# Patient Record
Sex: FEMALE | Race: WHITE
[De-identification: names, ages, dates, MRNs, and addresses within clinical notes are randomized per-mention and may not be internally consistent; named-entity substitution may affect disease eponyms.]

---

## 2019-10-16 ENCOUNTER — HOSPITAL ENCOUNTER (EMERGENCY)
Dept: HOSPITAL 7 - FB.ED | Age: 59
LOS: 1 days | Discharge: NURSING, REHAB OR LONG-TERM CARE | End: 2019-10-17
Payer: MEDICAID

## 2019-10-16 DIAGNOSIS — E03.9: ICD-10-CM

## 2019-10-16 DIAGNOSIS — E87.6: ICD-10-CM

## 2019-10-16 DIAGNOSIS — K70.9: ICD-10-CM

## 2019-10-16 DIAGNOSIS — F32.9: ICD-10-CM

## 2019-10-16 DIAGNOSIS — K92.2: Primary | ICD-10-CM

## 2019-10-16 DIAGNOSIS — Z88.2: ICD-10-CM

## 2019-10-16 DIAGNOSIS — F17.210: ICD-10-CM

## 2019-10-16 DIAGNOSIS — I10: ICD-10-CM

## 2019-10-16 PROCEDURE — 96376 TX/PRO/DX INJ SAME DRUG ADON: CPT

## 2019-10-16 PROCEDURE — 96375 TX/PRO/DX INJ NEW DRUG ADDON: CPT

## 2019-10-16 PROCEDURE — 81001 URINALYSIS AUTO W/SCOPE: CPT

## 2019-10-16 PROCEDURE — 86922 COMPATIBILITY TEST ANTIGLOB: CPT

## 2019-10-16 PROCEDURE — 96365 THER/PROPH/DIAG IV INF INIT: CPT

## 2019-10-16 PROCEDURE — 99285 EMERGENCY DEPT VISIT HI MDM: CPT

## 2019-10-16 PROCEDURE — 51702 INSERT TEMP BLADDER CATH: CPT

## 2019-10-16 PROCEDURE — 85025 COMPLETE CBC W/AUTO DIFF WBC: CPT

## 2019-10-16 PROCEDURE — 80053 COMPREHEN METABOLIC PANEL: CPT

## 2019-10-16 PROCEDURE — 36415 COLL VENOUS BLD VENIPUNCTURE: CPT

## 2019-10-16 PROCEDURE — G0480 DRUG TEST DEF 1-7 CLASSES: HCPCS

## 2019-10-16 PROCEDURE — 80320 DRUG SCREEN QUANTALCOHOLS: CPT

## 2019-10-16 PROCEDURE — 36430 TRANSFUSION BLD/BLD COMPNT: CPT

## 2019-10-16 PROCEDURE — P9016 RBC LEUKOCYTES REDUCED: HCPCS

## 2019-10-16 PROCEDURE — C9113 INJ PANTOPRAZOLE SODIUM, VIA: HCPCS

## 2019-10-16 PROCEDURE — 96366 THER/PROPH/DIAG IV INF ADDON: CPT

## 2019-10-16 PROCEDURE — 82150 ASSAY OF AMYLASE: CPT

## 2019-10-16 PROCEDURE — 85610 PROTHROMBIN TIME: CPT

## 2019-10-16 PROCEDURE — 86901 BLOOD TYPING SEROLOGIC RH(D): CPT

## 2019-10-16 PROCEDURE — 86920 COMPATIBILITY TEST SPIN: CPT

## 2019-10-16 PROCEDURE — 86850 RBC ANTIBODY SCREEN: CPT

## 2019-10-16 PROCEDURE — 86900 BLOOD TYPING SEROLOGIC ABO: CPT

## 2019-10-16 PROCEDURE — 83735 ASSAY OF MAGNESIUM: CPT

## 2019-10-16 PROCEDURE — P9017 PLASMA 1 DONOR FRZ W/IN 8 HR: HCPCS

## 2019-10-16 NOTE — EDM.PDOC
ED HPI GENERAL MEDICAL PROBLEM





- General


Chief Complaint: Abdominal Pain


Stated Complaint: GI BLEED


Time Seen by Provider: 10/16/19 23:26


Source of Information: Reports: Patient, EMS, Family


History Limitations: Reports: No Limitations





- History of Present Illness


INITIAL COMMENTS - FREE TEXT/NARRATIVE: 





Patient presents by EMS for coffee ground emesis x 2 days associated with 

generalized abdominal pain and decreased appetite. She has a h/o alcohol abuse 

and associated liver disease. The patient continues to consume @1L hard alcohol 

every 3 days. Daughter noticed her urine was dark tonight perhaps due to blood, 

she has also noticed that her mother's has been somewhat more confused over the 

last 2 days. Daughter and patient deny prior h/o GI bleed.


Duration: Day(s): (2)


Location: Reports: Abdomen


Associated Symptoms: Reports: Nausea/Vomiting





- Related Data


 Allergies











Allergy/AdvReac Type Severity Reaction Status Date / Time


 


Sulfa (Sulfonamide Allergy  Vomiting Verified 10/17/19 00:29





Antibiotics)     











Home Meds: 


 Home Meds





Levothyroxine Sodium [Synthroid] 62.5 mcg PO MO 04/12/16 [History]


Levothyroxine Sodium [Synthroid] 125 mcg PO SUMOTUWETHSA 04/12/16 [History]


Venlafaxine [Effexor XR] 150 mg PO DAILY 04/12/16 [History]


Montelukast Sodium [Singulair] 10 mg PO DAILY 10/17/19 [History]


Potassium Chloride 10 meq PO DAILY 10/17/19 [History]


Pravastatin Sodium 20 mg PO BEDTIME 10/17/19 [History]











Past Medical History


HEENT History: Reports: Other (See Below)


Other HEENT History: Wears glasses.


Cardiovascular History: Reports: Hypertension.  Denies: CAD


Gastrointestinal History: Reports: Other (See Below) (Alcoholic liver disease)


OB/GYN History: Reports: Pregnancy


Musculoskeletal History: Reports: Other (See Below)


Other Musculoskeletal History: Fingers affected, etiology unknown, left hand.


Psychiatric History: Reports: Depression, Other (See Below)


Other Psychiatric History: ALCOHOL ABUSE


Endocrine/Metabolic History: Reports: Hypothyroidism


Dermatologic History: Reports: Other (See Below)


Other Dermatologic History: Had a cyst in her neck area, surgically removed.  

Experiences dry skin.





- Infectious Disease History


Infectious Disease History: Reports: Chicken Pox, Mumps, Other (See Below)


Other Infectious Disease History: States she had measles in the past, uncertain 

if Telugu or Red.





- Past Surgical History


Female  Surgical History: Reports: Hysterectomy, Other (See Below)





Social & Family History





- Family History


Family Medical History: Unobtainable





- Tobacco Use


Smoking Status *Q: Current Every Day Smoker


Tobacco Use Within Last Twelve Months: Cigarettes





- Alcohol Use


Alcohol Use History: Yes


Alcohol Use in Last Twelve Months: Yes


Alcohol Use Frequency: Daily





- Recreational Drug Use


Recreational Drug Use: No





ED ROS GENERAL





- Review of Systems


Review Of Systems: ROS reveals no pertinent complaints other than HPI.





ED EXAM, GI/ABD





- Physical Exam


Exam: See Below


Exam Limited By: No Limitations


General Appearance: Alert, No Apparent Distress


Eyes: Bilateral: EOMI (bilateral icteric sclera)


Ears: Normal External Exam


Nose: Normal Inspection


Throat/Mouth: No Airway Compromise


Head: Atraumatic, Normocephalic


Neck: Normal Inspection, Full Range of Motion


Respiratory/Chest: No Respiratory Distress, Lungs Clear, Normal Breath Sounds


Cardiovascular: Regular Rate, Rhythm, No Murmur


GI/Abdominal Exam: Normal Bowel Sounds, Soft, Distended (mild), Tender (mild 

generalized).  No: Guarding, Rigid, Rebound


Extremities: Pedal Edema


Neurological: Alert, Oriented, No Motor/Sensory Deficits


Psychiatric: Flat Affect


Skin Exam: Intact, Jaundice





Course





- Vital Signs


Text/Narrative:: 





Triage Vitals: T96.7F, /61, , Sa02 100%RA





0115: /67, , Sa02 100%RA


Last Recorded V/S: 


 Last Vital Signs











Temp  35.8 C   10/16/19 23:20


 


Pulse  127 H  10/16/19 23:20


 


Resp  18   10/16/19 23:20


 


BP  127/60   10/16/19 23:20


 


Pulse Ox  100   10/16/19 23:20














- Orders/Labs/Meds


Orders: 


 Active Orders 24 hr











 Category Date Time Status


 


 De La Garza Catheter Insertion [Insert Urinary Catheter] [OM. Care  10/16/19 23:30 

Ordered





 PC] Q24H   


 


 Urinary Catheter Assessment [RC] QSHIFT Care  10/16/19 23:27 Active


 


 AMMONIA, PLASMA Stat Lab  10/16/19 23:40 Stop Req


 


 FRESH FROZEN PLASMA [BBK] Stat Lab  10/17/19 00:05 Received


 


 PATIENT RETYPE [BBK] Stat Lab  10/16/19 23:40 Results


 


 RED BLOOD CELLS LP [BBK] Stat Lab  10/17/19 00:07 Received


 


 TYPE AND SCREEN [BBK] Stat Lab  10/16/19 23:40 Received


 


 Pantoprazole [ProTONIX IV***] 80 mg Med  10/16/19 23:30 Active





 Sodium Chloride 0.9% [Normal Saline] 100 ml   





 IV .Continuous   


 


 Potassium Chloride [KCL 20 MEQ in Water 100 ML] 20 meq Med  10/17/19 00:47 

Active





 Premix Bag 1 bag   





 IV ONETIME   


 


 Sodium Chloride 0.9% [Normal Saline] 1,000 ml Med  10/16/19 23:30 Active





 IV ASDIRECTED   


 


 Sodium Chloride 0.9% [Normal Saline] 250 ml Med  10/17/19 00:15 Active





 IV ASDIRECTED   


 


 Sodium Chloride 0.9% [Normal Saline] 250 ml Med  10/17/19 00:15 Active





 IV ASDIRECTED   


 


 Sodium Chloride 0.9% [Saline Flush] Med  10/16/19 23:20 Active





 10 ml FLUSH ASDIRECTED PRN   


 


 Sodium Chloride 0.9% [Saline Flush] Med  10/16/19 23:20 Active





 10 ml FLUSH ASDIRECTED PRN   


 


 Saline Lock Insert [OM.PC] Routine Oth  10/16/19 23:20 Ordered


 


 Saline Lock Insert [OM.PC] Routine Oth  10/16/19 23:20 Ordered


 


 Transfuse Fresh Frozen Plasma [COMM] Stat Oth  10/17/19 00:08 Ordered


 


 Transfuse PRBC [Transfuse Red Blood Cells] [COMM] Stat Oth  10/17/19 00:08 

Ordered








 Medication Orders





Pantoprazole Sodium 80 mg/ (Sodium Chloride)  100 mls @ 10 mls/hr IV 

.Continuous ALLIE


   Last Admin: 10/17/19 00:05  Dose: 10 mls/hr


Sodium Chloride (Normal Saline)  1,000 mls @ 200 mls/hr IV ASDIRECTED ALLIE


   Last Admin: 10/16/19 23:45  Dose: 200 mls/hr


Sodium Chloride (Normal Saline)  250 mls @ 100 mls/hr IV ASDIRECTED ALLIE


Sodium Chloride (Normal Saline)  250 mls @ 100 mls/hr IV ASDIRECTED ALLIE


Potassium Chloride 20 meq/ (Premix)  100 mls @ 50 mls/hr IV ONETIME ONE


   Stop: 10/17/19 02:46


   Last Admin: 10/17/19 00:55  Dose: 50 mls/hr


Sodium Chloride (Saline Flush)  10 ml FLUSH ASDIRECTED PRN


   PRN Reason: Keep Vein Open


   Last Admin: 10/16/19 23:40  Dose: 10 ml


Sodium Chloride (Saline Flush)  10 ml FLUSH ASDIRECTED PRN


   PRN Reason: Keep Vein Open


   Last Admin: 10/17/19 00:15  Dose: 10 ml








Labs: 


 Laboratory Tests











  10/16/19 10/16/19 10/16/19 Range/Units





  23:19 23:40 23:40 


 


WBC   13.5 H   (4.5-12.0)  X10-3/uL


 


RBC   2.26 L   (3.23-5.20)  x10(6)uL


 


Hgb   7.4 L   (11.5-15.5)  g/dL


 


Hct   22.5 L D   (30.0-51.3)  %


 


MCV   99.6 H   (80-96)  fL


 


MCH   32.6   (27.7-33.6)  pg


 


MCHC   32.8   (32.2-35.4)  g/dL


 


RDW   28.6 H   (11.5-15.5)  %


 


Plt Count   129   (125-369)  X10(3)uL


 


MPV   9.1   (7.4-10.4)  fL


 


Add Manual Diff   Yes   


 


Neutrophils % (Manual)   50   (46-82)  %


 


Band Neutrophils %   4   (0-6)  %


 


Lymphocytes % (Manual)   38 H   (13-37)  %


 


Monocytes % (Manual)   8   (4-12)  %


 


Anisocytosis   Moderate H   


 


PT    18.5 H  (8.7-11.1)  


 


INR    1.92 H  (0.89-1.13)  


 


Sodium     (135-145)  mmol/L


 


Potassium     (3.5-5.3)  mmol/L


 


Chloride     (100-110)  mmol/L


 


Carbon Dioxide     (21-32)  mmol/L


 


BUN     (7-18)  mg/dL


 


Creatinine     (0.55-1.02)  mg/dL


 


Est Cr Clr Drug Dosing     mL/min


 


Estimated GFR (MDRD)     (>60)  


 


BUN/Creatinine Ratio     (9-20)  


 


Glucose     ()  mg/dL


 


Calcium     (8.6-10.2)  mg/dL


 


Magnesium     (1.8-2.5)  mg/dL


 


Total Bilirubin     (0.1-1.3)  mg/dL


 


AST     (5-25)  IU/L


 


ALT     (12-36)  U/L


 


Alkaline Phosphatase     ()  IU/L


 


Total Protein     (6.0-8.0)  g/dL


 


Albumin     (3.5-5.2)  g/dL


 


Globulin     g/dL


 


Albumin/Globulin Ratio     


 


Amylase     ()  U/L


 


Urine Color  Yellow    (YELLOW)  


 


Urine Appearance  Slightly cloudy    (CLEAR)  


 


Urine pH  5.0    (5.0-6.5)  


 


Ur Specific Gravity  1.020    (1.010-1.025)  


 


Urine Protein  Negative    (NEGATIVE)  mg/dL


 


Urine Glucose (UA)  Normal    (NORMAL)  mg/dL


 


Urine Ketones  15 H    (NEGATIVE)  mg/dL


 


Urine Occult Blood  Moderate H    (NEGATIVE)  


 


Urine Nitrite  Negative    (NEGATIVE)  


 


Urine Bilirubin  Small H    (NEGATIVE)  


 


Urine Urobilinogen  1 H    (NEGATIVE)  mg/dL


 


Ur Leukocyte Esterase  Negative    (NEGATIVE)  


 


Urine RBC  5-10 H    (0-5)  


 


Urine WBC  0-5    (0-5)  


 


Ur Squamous Epith Cells  Few H    (NS,R,O)  


 


Urine Bacteria  Few H    (NS)  


 


Ethyl Alcohol     (<0.03)  %


 


Blood Type     


 


Gel Antibody Screen     


 


Crossmatch     














  10/16/19 10/16/19 10/16/19 Range/Units





  23:40 23:40 23:40 


 


WBC     (4.5-12.0)  X10-3/uL


 


RBC     (3.23-5.20)  x10(6)uL


 


Hgb     (11.5-15.5)  g/dL


 


Hct     (30.0-51.3)  %


 


MCV     (80-96)  fL


 


MCH     (27.7-33.6)  pg


 


MCHC     (32.2-35.4)  g/dL


 


RDW     (11.5-15.5)  %


 


Plt Count     (125-369)  X10(3)uL


 


MPV     (7.4-10.4)  fL


 


Add Manual Diff     


 


Neutrophils % (Manual)     (46-82)  %


 


Band Neutrophils %     (0-6)  %


 


Lymphocytes % (Manual)     (13-37)  %


 


Monocytes % (Manual)     (4-12)  %


 


Anisocytosis     


 


PT     (8.7-11.1)  


 


INR     (0.89-1.13)  


 


Sodium  131 L    (135-145)  mmol/L


 


Potassium  1.7 L*    (3.5-5.3)  mmol/L


 


Chloride  80 L*    (100-110)  mmol/L


 


Carbon Dioxide  18 L    (21-32)  mmol/L


 


BUN  7    (7-18)  mg/dL


 


Creatinine  1.4 H    (0.55-1.02)  mg/dL


 


Est Cr Clr Drug Dosing  31.75    mL/min


 


Estimated GFR (MDRD)  39 L    (>60)  


 


BUN/Creatinine Ratio  5.0 L    (9-20)  


 


Glucose  76 L    ()  mg/dL


 


Calcium  9.8    (8.6-10.2)  mg/dL


 


Magnesium  1.6 L    (1.8-2.5)  mg/dL


 


Total Bilirubin  9.6 H    (0.1-1.3)  mg/dL


 


AST  736 H*    (5-25)  IU/L


 


ALT  190 H*    (12-36)  U/L


 


Alkaline Phosphatase  129 H    ()  IU/L


 


Total Protein  6.7    (6.0-8.0)  g/dL


 


Albumin  3.2 L    (3.5-5.2)  g/dL


 


Globulin  3.5    g/dL


 


Albumin/Globulin Ratio  0.9    


 


Amylase  63    ()  U/L


 


Urine Color     (YELLOW)  


 


Urine Appearance     (CLEAR)  


 


Urine pH     (5.0-6.5)  


 


Ur Specific Gravity     (1.010-1.025)  


 


Urine Protein     (NEGATIVE)  mg/dL


 


Urine Glucose (UA)     (NORMAL)  mg/dL


 


Urine Ketones     (NEGATIVE)  mg/dL


 


Urine Occult Blood     (NEGATIVE)  


 


Urine Nitrite     (NEGATIVE)  


 


Urine Bilirubin     (NEGATIVE)  


 


Urine Urobilinogen     (NEGATIVE)  mg/dL


 


Ur Leukocyte Esterase     (NEGATIVE)  


 


Urine RBC     (0-5)  


 


Urine WBC     (0-5)  


 


Ur Squamous Epith Cells     (NS,R,O)  


 


Urine Bacteria     (NS)  


 


Ethyl Alcohol   < 0.03   (<0.03)  %


 


Blood Type    A POSITIVE  


 


Gel Antibody Screen    Negative  


 


Crossmatch    See Detail  











Meds: 


Medications











Generic Name Dose Route Start Last Admin





  Trade Name Freq  PRN Reason Stop Dose Admin


 


Pantoprazole Sodium 80 mg/  100 mls @ 10 mls/hr  10/16/19 23:30  10/17/19 00:05





  Sodium Chloride  IV   10 mls/hr





  .Continuous ALLIE   Administration





     





     





     





     


 


Sodium Chloride  1,000 mls @ 200 mls/hr  10/16/19 23:30  10/16/19 23:45





  Normal Saline  IV   200 mls/hr





  ASDIRECTED ALLIE   Administration





     





     





     





     


 


Sodium Chloride  250 mls @ 100 mls/hr  10/17/19 00:15  





  Normal Saline  IV   





  ASDIRECTED ALLIE   





     





     





     





     


 


Sodium Chloride  250 mls @ 100 mls/hr  10/17/19 00:15  





  Normal Saline  IV   





  ASDIRECTED ALLIE   





     





     





     





     


 


Potassium Chloride 20 meq/  100 mls @ 50 mls/hr  10/17/19 00:47  10/17/19 00:55





  Premix  IV  10/17/19 02:46  50 mls/hr





  ONETIME ONE   Administration





     





     





     





     


 


Sodium Chloride  10 ml  10/16/19 23:20  10/16/19 23:40





  Saline Flush  FLUSH   10 ml





  ASDIRECTED PRN   Administration





  Keep Vein Open   





     





     





     


 


Sodium Chloride  10 ml  10/16/19 23:20  10/17/19 00:15





  Saline Flush  FLUSH   10 ml





  ASDIRECTED PRN   Administration





  Keep Vein Open   





     





     





     














Discontinued Medications














Generic Name Dose Route Start Last Admin





  Trade Name Freq  PRN Reason Stop Dose Admin


 


Octreotide Acetate  100 mcg  10/17/19 01:02  





  Sandostatin  IVPUSH  10/17/19 01:03  





  ONETIME ONE   





     





     





     





     


 


Ondansetron HCl  4 mg  10/16/19 23:22  10/16/19 23:45





  Zofran  IVPUSH  10/16/19 23:23  4 mg





  ONETIME ONE   Administration





     





     





     





     


 


Pantoprazole Sodium  80 mg  10/16/19 23:21  10/16/19 23:50





  Protonix Iv***  IVPUSH  10/16/19 23:22  80 mg





  .BOLUS ONE   Administration





     





     





     





     


 


Thiamine HCl  100 mg  10/16/19 23:42  10/17/19 00:00





  Vitamin B-1  IVPUSH  10/16/19 23:43  100 mg





  ONETIME ONE   Administration





     





     





     





     














- Re-Assessments/Exams


Free Text/Narrative Re-Assessment/Exam: 





10/17/19 01:16


FFP and PRBC transfusion ordered.


Dr. Abreu accepts patient for transfer to [facility], 

recommends Octreotide 100 mcg IVP.


Patient will be transferred by ALS ground.





Departure





- Departure


Time of Disposition: 01:19


Disposition: DC/Tfer to Acute Hospital [facility]


Condition: Serious


Clinical Impression: 


 Upper GI bleed, Hypokalemia, Alcoholic liver disease








- Discharge Information


*PRESCRIPTION DRUG MONITORING PROGRAM REVIEWED*: No


*COPY OF PRESCRIPTION DRUG MONITORING REPORT IN PATIENT RAFAEL: Not Applicable


Referrals: 


PCP,Unknown [Primary Care Provider] - 


Forms:  ED Department Discharge





- My Orders


Last 24 Hours: 


My Active Orders





10/16/19 23:20


Sodium Chloride 0.9% [Saline Flush]   10 ml FLUSH ASDIRECTED PRN 


Sodium Chloride 0.9% [Saline Flush]   10 ml FLUSH ASDIRECTED PRN 


Saline Lock Insert [OM.PC] Routine 


Saline Lock Insert [OM.PC] Routine 





10/16/19 23:27


Urinary Catheter Assessment [RC] QSHIFT 





10/16/19 23:30


De La Garza Catheter Insertion [Insert Urinary Catheter] [OM.PC] Q24H 


Pantoprazole [ProTONIX IV***] 80 mg   Sodium Chloride 0.9% [Normal Saline] 100 

ml IV .Continuous 


Sodium Chloride 0.9% [Normal Saline] 1,000 ml IV ASDIRECTED 





10/16/19 23:40


AMMONIA, PLASMA Stat 


PATIENT RETYPE [BBK] Stat 


TYPE AND SCREEN [BBK] Stat 





10/17/19 00:05


FRESH FROZEN PLASMA [BBK] Stat 





10/17/19 00:07


RED BLOOD CELLS LP [BBK] Stat 





10/17/19 00:08


Transfuse Fresh Frozen Plasma [COMM] Stat 


Transfuse PRBC [Transfuse Red Blood Cells] [COMM] Stat 





10/17/19 00:15


Sodium Chloride 0.9% [Normal Saline] 250 ml IV ASDIRECTED 


Sodium Chloride 0.9% [Normal Saline] 250 ml IV ASDIRECTED 





10/17/19 00:47


Potassium Chloride [KCL 20 MEQ in Water 100 ML] 20 meq   Premix Bag 1 bag IV 

ONETIME 














- Assessment/Plan


Last 24 Hours: 


My Active Orders





10/16/19 23:20


Sodium Chloride 0.9% [Saline Flush]   10 ml FLUSH ASDIRECTED PRN 


Sodium Chloride 0.9% [Saline Flush]   10 ml FLUSH ASDIRECTED PRN 


Saline Lock Insert [OM.PC] Routine 


Saline Lock Insert [OM.PC] Routine 





10/16/19 23:27


Urinary Catheter Assessment [RC] QSHIFT 





10/16/19 23:30


De La Garza Catheter Insertion [Insert Urinary Catheter] [OM.PC] Q24H 


Pantoprazole [ProTONIX IV***] 80 mg   Sodium Chloride 0.9% [Normal Saline] 100 

ml IV .Continuous 


Sodium Chloride 0.9% [Normal Saline] 1,000 ml IV ASDIRECTED 





10/16/19 23:40


AMMONIA, PLASMA Stat 


PATIENT RETYPE [BBK] Stat 


TYPE AND SCREEN [BBK] Stat 





10/17/19 00:05


FRESH FROZEN PLASMA [BBK] Stat 





10/17/19 00:07


RED BLOOD CELLS LP [BBK] Stat 





10/17/19 00:08


Transfuse Fresh Frozen Plasma [COMM] Stat 


Transfuse PRBC [Transfuse Red Blood Cells] [COMM] Stat 





10/17/19 00:15


Sodium Chloride 0.9% [Normal Saline] 250 ml IV ASDIRECTED 


Sodium Chloride 0.9% [Normal Saline] 250 ml IV ASDIRECTED 





10/17/19 00:47


Potassium Chloride [KCL 20 MEQ in Water 100 ML] 20 meq   Premix Bag 1 bag IV 

ONETIME

## 2019-10-17 VITALS — HEART RATE: 102 BPM | SYSTOLIC BLOOD PRESSURE: 110 MMHG | DIASTOLIC BLOOD PRESSURE: 64 MMHG
